# Patient Record
Sex: MALE | Race: WHITE | NOT HISPANIC OR LATINO | Employment: UNEMPLOYED | URBAN - METROPOLITAN AREA
[De-identification: names, ages, dates, MRNs, and addresses within clinical notes are randomized per-mention and may not be internally consistent; named-entity substitution may affect disease eponyms.]

---

## 2022-05-27 ENCOUNTER — HOSPITAL ENCOUNTER (EMERGENCY)
Facility: HOSPITAL | Age: 52
Discharge: PSYCHIATRIC OR SUBSTANCE USE FACILITY | End: 2022-05-28
Attending: EMERGENCY MEDICINE
Payer: MEDICAID

## 2022-05-27 DIAGNOSIS — R45.851 DEPRESSION WITH SUICIDAL IDEATION: Primary | ICD-10-CM

## 2022-05-27 DIAGNOSIS — F32.A DEPRESSION WITH SUICIDAL IDEATION: Primary | ICD-10-CM

## 2022-05-27 LAB
ALBUMIN SERPL-MCNC: 4 GM/DL (ref 3.5–5)
ALBUMIN/GLOB SERPL: 1.2 RATIO (ref 1.1–2)
ALP SERPL-CCNC: 159 UNIT/L (ref 40–150)
ALT SERPL-CCNC: 16 UNIT/L (ref 0–55)
AMPHET UR QL SCN: NEGATIVE
APAP SERPL-MCNC: <17.4 UG/ML (ref 17.4–30)
APPEARANCE UR: CLEAR
AST SERPL-CCNC: 21 UNIT/L (ref 5–34)
BACTERIA #/AREA URNS AUTO: NORMAL /HPF
BARBITURATE SCN PRESENT UR: NEGATIVE
BASOPHILS # BLD AUTO: 0.05 X10(3)/MCL (ref 0–0.2)
BASOPHILS NFR BLD AUTO: 0.9 %
BENZODIAZ UR QL SCN: NEGATIVE
BILIRUB UR QL STRIP.AUTO: NEGATIVE MG/DL
BILIRUBIN DIRECT+TOT PNL SERPL-MCNC: 0.5 MG/DL
BUN SERPL-MCNC: 22.4 MG/DL (ref 8.4–25.7)
CALCIUM SERPL-MCNC: 9.7 MG/DL (ref 8.4–10.2)
CANNABINOIDS UR QL SCN: NEGATIVE
CHLORIDE SERPL-SCNC: 107 MMOL/L (ref 98–107)
CO2 SERPL-SCNC: 25 MMOL/L (ref 22–29)
COCAINE UR QL SCN: NEGATIVE
COLOR UR AUTO: YELLOW
CREAT SERPL-MCNC: 1 MG/DL (ref 0.73–1.18)
EOSINOPHIL # BLD AUTO: 0.16 X10(3)/MCL (ref 0–0.9)
EOSINOPHIL NFR BLD AUTO: 2.8 %
ERYTHROCYTE [DISTWIDTH] IN BLOOD BY AUTOMATED COUNT: 13.1 % (ref 11.5–17)
ETHANOL SERPL-MCNC: <10 MG/DL
FENTANYL UR QL SCN: NEGATIVE
GLOBULIN SER-MCNC: 3.3 GM/DL (ref 2.4–3.5)
GLUCOSE SERPL-MCNC: 122 MG/DL (ref 74–100)
GLUCOSE UR QL STRIP.AUTO: NEGATIVE MG/DL
HCT VFR BLD AUTO: 43.7 % (ref 42–52)
HGB BLD-MCNC: 14 GM/DL (ref 14–18)
IMM GRANULOCYTES # BLD AUTO: 0.02 X10(3)/MCL (ref 0–0.02)
IMM GRANULOCYTES NFR BLD AUTO: 0.4 % (ref 0–0.43)
KETONES UR QL STRIP.AUTO: NEGATIVE MG/DL
LEUKOCYTE ESTERASE UR QL STRIP.AUTO: NEGATIVE UNIT/L
LYMPHOCYTES # BLD AUTO: 1.69 X10(3)/MCL (ref 0.6–4.6)
LYMPHOCYTES NFR BLD AUTO: 29.8 %
MCH RBC QN AUTO: 32 PG (ref 27–31)
MCHC RBC AUTO-ENTMCNC: 32 MG/DL (ref 33–36)
MCV RBC AUTO: 100 FL (ref 80–94)
MDMA UR QL SCN: NEGATIVE
MONOCYTES # BLD AUTO: 0.57 X10(3)/MCL (ref 0.1–1.3)
MONOCYTES NFR BLD AUTO: 10 %
NEUTROPHILS # BLD AUTO: 3.2 X10(3)/MCL (ref 2.1–9.2)
NEUTROPHILS NFR BLD AUTO: 56.1 %
NITRITE UR QL STRIP.AUTO: NEGATIVE
NRBC BLD AUTO-RTO: 0 %
OPIATES UR QL SCN: NEGATIVE
PCP UR QL: NEGATIVE
PH UR STRIP.AUTO: 6.5 [PH]
PH UR: 6.5 [PH] (ref 3–11)
PLATELET # BLD AUTO: 289 X10(3)/MCL (ref 130–400)
PMV BLD AUTO: 9.4 FL (ref 9.4–12.4)
POTASSIUM SERPL-SCNC: 4.5 MMOL/L (ref 3.5–5.1)
PROT SERPL-MCNC: 7.3 GM/DL (ref 6.4–8.3)
PROT UR QL STRIP.AUTO: NEGATIVE MG/DL
RBC # BLD AUTO: 4.37 X10(6)/MCL (ref 4.7–6.1)
RBC #/AREA URNS AUTO: <5 /HPF
RBC UR QL AUTO: NEGATIVE UNIT/L
SODIUM SERPL-SCNC: 140 MMOL/L (ref 136–145)
SP GR UR STRIP.AUTO: 1.02 (ref 1–1.03)
SPECIFIC GRAVITY, URINE AUTO (.000) (OHS): 1.02 (ref 1–1.03)
SQUAMOUS #/AREA URNS AUTO: <4 /LPF
TSH SERPL-ACNC: 2.02 UIU/ML (ref 0.35–4.94)
UROBILINOGEN UR STRIP-ACNC: 0.2 MG/DL
WBC # SPEC AUTO: 5.7 X10(3)/MCL (ref 4.5–11.5)
WBC #/AREA URNS AUTO: <5 /HPF

## 2022-05-27 PROCEDURE — 81001 URINALYSIS AUTO W/SCOPE: CPT | Performed by: PHYSICIAN ASSISTANT

## 2022-05-27 PROCEDURE — 82077 ASSAY SPEC XCP UR&BREATH IA: CPT | Performed by: PHYSICIAN ASSISTANT

## 2022-05-27 PROCEDURE — 80143 DRUG ASSAY ACETAMINOPHEN: CPT | Performed by: PHYSICIAN ASSISTANT

## 2022-05-27 PROCEDURE — 84443 ASSAY THYROID STIM HORMONE: CPT | Performed by: PHYSICIAN ASSISTANT

## 2022-05-27 PROCEDURE — 36415 COLL VENOUS BLD VENIPUNCTURE: CPT | Performed by: PHYSICIAN ASSISTANT

## 2022-05-27 PROCEDURE — 99285 EMERGENCY DEPT VISIT HI MDM: CPT | Mod: 25

## 2022-05-27 PROCEDURE — 80307 DRUG TEST PRSMV CHEM ANLYZR: CPT | Performed by: PHYSICIAN ASSISTANT

## 2022-05-27 PROCEDURE — 85025 COMPLETE CBC W/AUTO DIFF WBC: CPT | Performed by: PHYSICIAN ASSISTANT

## 2022-05-27 PROCEDURE — 80053 COMPREHEN METABOLIC PANEL: CPT | Performed by: PHYSICIAN ASSISTANT

## 2022-05-27 PROCEDURE — 25000003 PHARM REV CODE 250: Performed by: EMERGENCY MEDICINE

## 2022-05-27 RX ORDER — LORAZEPAM 1 MG/1
2 TABLET ORAL
Status: COMPLETED | OUTPATIENT
Start: 2022-05-27 | End: 2022-05-27

## 2022-05-27 RX ORDER — IBUPROFEN 200 MG
1 TABLET ORAL DAILY
Status: CANCELLED | OUTPATIENT
Start: 2022-05-28

## 2022-05-27 RX ADMIN — LORAZEPAM 2 MG: 1 TABLET ORAL at 04:05

## 2022-05-27 NOTE — ED PROVIDER NOTES
"Encounter Date: 5/27/2022       History     Chief Complaint   Patient presents with    Psychiatric Evaluation     Pt to ER via Police for OPC from family.  Per  pt made suicidal statements to family.  Pt denies statements.  States he is 'couch surfing' now due to relocating and not having a job.  Pt states he "I think my parents are going senile".       51-year-old male presents to the emergency department on order protective custody by his mother for danger to self/suicidal statements made to mother in text message form.  Patient denies making these statements.  Per my discussion with his mother, Alice Newman, via phone call just prior to evaluating the patient, she reported and read off various text messages to me over the last couple of days during which the patient reported that he does not know what he did so wrong in life, multiple statements of hopelessness about the future.  She specifically quoted a message that stated that I will be suicidal soon, I guarantee it .  She was very concerned about his mental state as he apparently recently lost his job, lost his housing, is not able to have time with his kids unless directly supervised at their home. States that he has refused when they suggested he needs to get help for his worsening depression.     .    The history is provided by the patient and a relative.   Mental Health Problem  The primary symptoms include depressed mood and dysphoric mood. The primary symptoms do not include self-injury. The current episode started several days ago. This is a new problem.   The onset of the illness is precipitated by stressful event. The degree of incapacity that he is experiencing as a consequence of his illness is mild. Sequelae of the illness include harmed interpersonal relations and homelessness. He admits to suicidal ideas. He does not have a plan to attempt suicide. He contemplates harming himself. He has not already injured self. He does not " contemplate injuring another person. He has not already  injured another person.     Review of patient's allergies indicates:  No Known Allergies  History reviewed. No pertinent past medical history.  History reviewed. No pertinent surgical history.  No family history on file.     Review of Systems   Constitutional: Negative for chills and fever.   Respiratory: Negative for shortness of breath.    Gastrointestinal: Negative for vomiting.   Psychiatric/Behavioral: Positive for dysphoric mood. Negative for self-injury.   All other systems reviewed and are negative.      Physical Exam     Initial Vitals [05/27/22 1309]   BP Pulse Resp Temp SpO2   (!) 152/97 82 18 97.7 °F (36.5 °C) 99 %      MAP       --         Physical Exam    Nursing note and vitals reviewed.  Constitutional: He appears well-developed and well-nourished.   HENT:   Head: Normocephalic and atraumatic.   Eyes: Conjunctivae are normal. Pupils are equal, round, and reactive to light.   Cardiovascular: Regular rhythm.   Pulmonary/Chest: No respiratory distress.     Neurological: He is alert and oriented to person, place, and time. GCS score is 15. GCS eye subscore is 4. GCS verbal subscore is 5. GCS motor subscore is 6.   Skin: Skin is warm and dry.   Psychiatric: He has a normal mood and affect. His speech is normal. Thought content normal.         ED Course   Procedures  Labs Reviewed   COMPREHENSIVE METABOLIC PANEL - Abnormal; Notable for the following components:       Result Value    Glucose Level 122 (*)     Alkaline Phosphatase 159 (*)     All other components within normal limits   ACETAMINOPHEN LEVEL - Abnormal; Notable for the following components:    Acetaminophen Level <17.4 (*)     All other components within normal limits   CBC WITH DIFFERENTIAL - Abnormal; Notable for the following components:    RBC 4.37 (*)     .0 (*)     MCH 32.0 (*)     MCHC 32.0 (*)     IG# 0.02 (*)     All other components within normal limits   TSH - Normal    URINALYSIS, REFLEX TO URINE CULTURE - Normal   DRUG SCREEN, URINE (BEAKER) - Normal   ALCOHOL,MEDICAL (ETHANOL) - Normal   URINALYSIS, MICROSCOPIC - Normal   CBC W/ AUTO DIFFERENTIAL    Narrative:     The following orders were created for panel order CBC auto differential.  Procedure                               Abnormality         Status                     ---------                               -----------         ------                     CBC with Differential[[id number]]        Abnormal            Final result                 Please view results for these tests on the individual orders.          Imaging Results    None          Medications   LORazepam tablet 2 mg (2 mg Oral Given 5/27/22 0667)     Medical Decision Making:   Initial Assessment:   Mr. Jimenez presented for evaluation on OPC after making suicidal statements to his mother.  She read these off to me, certainly concerning for thoughts of self-harm particularly in the setting of multiple social stressors including loss of job, loss of housing, loss of access to his family.  Differential Diagnosis:   Depression with suicidal ideation, dysphoria, situational depression, adjustment disorder  Clinical Tests:   Lab Tests: Ordered and Reviewed  ED Management:  Physician's Emergency certificate filed for danger to self given the report per the mother of expressed suicidal thoughts.  Certainly, he is calm and cooperative here; however, he denies sending the text messages at all so concern that he is not forthcoming with all information and may be at risk of harming himself.  Physical examination and laboratory values demonstrate no acute emergent pathology that would preclude transfer to a psychiatric treatment facility.                       Clinical Impression:   Final diagnoses:  [F32.A, R45.851] Depression with suicidal ideation (Primary)                 Candace Fermin MD  05/27/22 5633

## 2022-05-27 NOTE — FIRST PROVIDER EVALUATION
Medical screening exam completed.  I have conducted a focused provider triage encounter, findings are as follows:    Brief history of present illness:  *Male transported to ER by [facility] under [facility]PC    Vitals:    05/27/22 1309   BP: (!) 152/97   Pulse: 82   Resp: 18   Temp: 97.7 °F (36.5 °C)   TempSrc: Oral   SpO2: 99%       Pertinent physical exam:  Awake alert ambulatory male    Brief workup plan:  Med clearance evaluation    Preliminary workup initiated; this workup will be continued and followed by the physician or advanced practice provider that is assigned to the patient when roomed.

## 2022-05-28 ENCOUNTER — HOSPITAL ENCOUNTER (INPATIENT)
Facility: HOSPITAL | Age: 52
LOS: 5 days | Discharge: OTHER INSTITUTION | DRG: 885 | End: 2022-06-02
Attending: PSYCHIATRY & NEUROLOGY | Admitting: PSYCHIATRY & NEUROLOGY
Payer: MEDICAID

## 2022-05-28 VITALS
RESPIRATION RATE: 18 BRPM | DIASTOLIC BLOOD PRESSURE: 82 MMHG | OXYGEN SATURATION: 99 % | SYSTOLIC BLOOD PRESSURE: 126 MMHG | HEART RATE: 82 BPM | TEMPERATURE: 98 F

## 2022-05-28 DIAGNOSIS — R45.851 DEPRESSION WITH SUICIDAL IDEATION: ICD-10-CM

## 2022-05-28 DIAGNOSIS — F32.A DEPRESSION WITH SUICIDAL IDEATION: ICD-10-CM

## 2022-05-28 PROCEDURE — 11000001 HC ACUTE MED/SURG PRIVATE ROOM

## 2022-05-28 PROCEDURE — 11400000 HC PSYCH PRIVATE ROOM

## 2022-05-28 RX ORDER — ALUMINUM HYDROXIDE, MAGNESIUM HYDROXIDE, AND SIMETHICONE 2400; 240; 2400 MG/30ML; MG/30ML; MG/30ML
30 SUSPENSION ORAL EVERY 6 HOURS PRN
Status: DISCONTINUED | OUTPATIENT
Start: 2022-05-28 | End: 2022-06-02 | Stop reason: HOSPADM

## 2022-05-28 RX ORDER — ADHESIVE BANDAGE
30 BANDAGE TOPICAL DAILY PRN
Status: DISCONTINUED | OUTPATIENT
Start: 2022-05-28 | End: 2022-06-02 | Stop reason: HOSPADM

## 2022-05-28 RX ORDER — HYDROXYZINE PAMOATE 25 MG/1
25 CAPSULE ORAL 4 TIMES DAILY PRN
Status: ON HOLD | COMMUNITY
Start: 2022-04-13 | End: 2022-05-31

## 2022-05-28 RX ORDER — IBUPROFEN 400 MG/1
400 TABLET ORAL EVERY 6 HOURS PRN
Status: DISCONTINUED | OUTPATIENT
Start: 2022-05-28 | End: 2022-06-02 | Stop reason: HOSPADM

## 2022-05-28 RX ORDER — ACETAMINOPHEN 325 MG/1
650 TABLET ORAL EVERY 6 HOURS PRN
Status: DISCONTINUED | OUTPATIENT
Start: 2022-05-28 | End: 2022-06-02 | Stop reason: HOSPADM

## 2022-05-28 NOTE — GROUP NOTE
Activity Group      Group Focus: Offered group to increase individual focus and peer interactions.     Number of patients in attendance: 6    Group Start Time: 1430  Group End Time:  1515  Groups Date: 5/28/2022  Group Topic:  Behavioral Health  Group Department: [facility]  Group Facilitators:  Lashell Deutsch  _____________________________________________________________________    Patient Name: Ranjith Jimenez  MRN: [id number]  Patient Class: IP- Psych   Admission Date\Time: 5/28/2022  3:32 AM  Hospital Length of Stay: 0  Primary Care Provider: Primary Doctor No     Referred by: Behavioral Medicine Unit Treatment Team     Target symptoms: Depression     Patient's response to treatment: Very good participation.     Progress toward goals: Progressing slowly     Interval History: N/A     Diagnosis: Depression with suicidal ideation     Plan: Continue treatment on BMU

## 2022-05-28 NOTE — H&P
History and Physical     Chief Complaint:     Suicidal ideation    HPI:     Patient is a 46 y.o. male with a past medical history significant for only depression which he states he does not take any current medical regimen to manage.  In the past he states medicine is making feel funny.  Patient states that he has been down his leg since COVID recently employed but lost his job probably some involved poor decision making.  He has been feeling this way for several months now.  He finds himself mostly homeless is at his mother's house and mentions that he does get some sort of helps him that he could see harming himself.  He endorses history of drug abuse including cocaine and methamphetamine but nothing recently.  He denies any other acute medical issues.        Primary Doctor No is the PCP    medical history - depression  surgical history none  family history father with coronary artery disease    Social History     Tobacco Use    Smoking status: Never Smoker    Smokeless tobacco: Never Used   Substance Use Topics    Alcohol use: Not on file   -remote cocaine and methamphetamine use none recently  (Not in a hospital admission)    Review of patient's allergies indicates:  Not on File         Review of Systems:     Review of Systems   Constitutional: Positive for malaise/fatigue.   HENT: Negative.    Eyes: Negative.    Respiratory: Negative.    Cardiovascular: Negative.    Gastrointestinal: Negative.    Genitourinary: Negative.    Musculoskeletal: Negative.    Neurological: Negative.    Endo/Heme/Allergies: Negative.    Psychiatric/Behavioral: Positive for depression and suicidal ideas.   All other systems reviewed and are negative.      Objective:       VITAL SIGNS: 24 HR MIN & MAX LAST    Temp  Min: 97.5 °F (36.4 °C)  Max: 98.2 °F (36.8 °C)           BP  Min: 112/85  Max: 153/93        Pulse  Min: 73  Max: 82        Resp  Min: 16  Max: 18       SpO2  Min: 99 %  Max: 99 %         Physical Exam  Vitals reviewed.    Constitutional:       General: He is not in acute distress.     Appearance: Normal appearance. He is normal weight. He is not ill-appearing or toxic-appearing.   HENT:      Head: Normocephalic.      Nose: Nose normal.   Eyes:      Extraocular Movements: Extraocular movements intact.      Conjunctiva/sclera: Conjunctivae normal.      Pupils: Pupils are equal, round, and reactive to light.   Cardiovascular:      Rate and Rhythm: Normal rate and regular rhythm.      Pulses: Normal pulses.      Heart sounds: Normal heart sounds. No murmur heard.    No gallop.   Pulmonary:      Effort: Pulmonary effort is normal. No respiratory distress.      Breath sounds: Normal breath sounds. No wheezing, rhonchi or rales.   Abdominal:      General: Bowel sounds are normal. There is no distension.      Palpations: Abdomen is soft.      Tenderness: There is no abdominal tenderness. There is no guarding or rebound.   Musculoskeletal:         General: No swelling, tenderness or deformity. Normal range of motion.      Cervical back: Normal range of motion and neck supple. No rigidity or tenderness.      Right lower leg: No edema.      Left lower leg: No edema.   Skin:     General: Skin is warm and dry.   Neurological:      General: No focal deficit present.      Mental Status: He is alert and oriented to person, place, and time.   Psychiatric:         Behavior: Behavior normal.      Comments: Flat affect     *      No results found for this or any previous visit (from the past 48 hour(s)).    No orders to display       Assessment / Plan:     Active Hospital Problems    Diagnosis    Suicidal ideation    Depression     # suicidal ideation from severe major depression-patient is currently homeless  -history of illicit drug abuse  -history of noncompliance with depression regimen  -otherwise no acute underlying medical problems  -management as per Psychiatry    Encourage ambulation for DVT prophylaxis    Components of this note were  documented using voice recognition systems; and are subject to errors not corrected at proof reading.  Please contact the author for any clarifications.

## 2022-05-28 NOTE — NURSING
Patient is a 51 year old male admitted to the services of Dr. Viramontes at [facility] from [facility].  Patient admitted via [facility] with a provisional diagnosis of Major Depression with Suicidal Ideations.  Patient reports that he was staying at a couple of people's homes for stability to obtain a job and his mom called and told him that he could live with her and his dad.  Patient stated he proceeded to his parents home and his mother began calling homeless shelter's, rehabs and [facility] group [facility] for him to live.  Patient stated he believes that his being in the family home was causing conflict between his parents.  Patient stated all he wanted was a breather from living on the streets and he became frustrated and texted his mother stating if he became homeless that he would probably have suicidal ideations.  Patient stated the next thing he knew, the  was at the house placing handcuffs on him.  Patient stated this is not the first time this has happened to him.  Patient stated 5-6 years ago his child was diagnosed with autism and his ex-wife OPC'd him and he was taken to [facility] for 8-9 days.  Patient stated his anxiety was very high in the ER and he was given Ativan 2mg which helped him to remain calm.  Patient is disheveled, constricted affect, and anxiious mood. Patient stated when the police came to pick him up he was playing golf frisbee and he left  his cell phone and personal effects at his parents home.  Patient stated he does not have a good sleep pattern and that he naps during the day.  Patient states it is hard to sleep good when you are sleeping outside then inside.  Patient admits to a history of alcohol and substance abuse.  Patient stated he was around a lot of musical bands and over indulged in alcohol and drugs in the past.  Patient stated he was in the [employer] from [employer] and was active during [employer]. 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  Patient stated his home is irrepairable due to someone named David who tore it up and is in the process of being foreclosed.  Patient also reports he lost his job due to Covid-19. Patient states he is unable to see his two kids like he would like to (ages 8,7)  Patient stated he recently worked for [employer] in [place] for approximately 6 months.  Patient has constricted affect and anxious mood.  Patient states his anxiety is back but not as bad.  Patient is restless, fidgety and has difficulty sitting still.  Urinary Drug Screen was negative.  No acute medical illnesses. Patient's name placed on H & P board to be seen by medical doctor.  Dietary notified.  No Known Drug Allergies.  Patient was searched and belongings inventoried.  Patient oriented to unit, staff, room and peers.  Per Dr. Trevino's orders, patient placed on Q 15 minute checks for safety.  No acute distress noted.  Will continue to monitor closely and provide emotional support.

## 2022-05-28 NOTE — NURSING
Ranjith has been primarily isolating in his room today;  Does get up for meals. Hospitalist did see pt today for H&P.  Ate 100% of both meals today, no complaints verbalized.  He does state that he is physically and emotionally tired;  Denies active SI, HI and denies AVH.  Did not attend groups today thus far.  Minimizes his responsibility for his current situation.  Q 15 min and prn checks continue for safety, SP.

## 2022-05-28 NOTE — GROUP NOTE
Medication Education      Group Focus: Offered group to increase knowledge of meds, side effect, safety and importance of compliance.      Number of patients in attendance: 4    Group Start Time: 0915  Group End Time:  1000  Groups Date: 5/28/2022  Group Topic:  Behavioral Health  Group Department: [facility]  Group Facilitators:  Yamilet Akins LPN  _____________________________________________________________________    Patient Name: Ranjith Jimenez  MRN: [id number]  Patient Class: IP- Psych   Admission Date\Time: 5/28/2022  3:32 AM  Hospital Length of Stay: 0  Primary Care Provider: Primary Doctor No     Referred by: Behavioral Medicine Unit Treatment Team     Target symptoms: {NA     Patient's response to treatment:DID NOT ATTEND     Progress toward goals: NA      Interval History: NA     Diagnosis: NA     Plan: NA

## 2022-05-29 PROBLEM — Z86.59 HISTORY OF PSYCHIATRIC HOSPITALIZATION: Status: ACTIVE | Noted: 2022-05-29

## 2022-05-29 PROBLEM — Z86.59 HISTORY OF PSYCHIATRIC HOSPITALIZATION: Status: RESOLVED | Noted: 2022-05-29 | Resolved: 2022-05-29

## 2022-05-29 PROBLEM — F33.9 RECURRENT MAJOR DEPRESSIVE DISORDER: Status: ACTIVE | Noted: 2022-05-28

## 2022-05-29 PROBLEM — F99 PSYCHIATRIC PROBLEM: Status: RESOLVED | Noted: 2022-05-29 | Resolved: 2022-05-29

## 2022-05-29 PROBLEM — F99 PSYCHIATRIC PROBLEM: Status: ACTIVE | Noted: 2022-05-29

## 2022-05-29 PROBLEM — F32.A DEPRESSIVE DISORDER: Status: RESOLVED | Noted: 2022-05-28 | Resolved: 2022-05-29

## 2022-05-29 LAB
CHOLEST SERPL-MCNC: 198 MG/DL
CHOLEST/HDLC SERPL: 3 {RATIO} (ref 0–5)
GLUCOSE P FAST SERPL-MCNC: 102 MG/DL (ref 74–100)
HDLC SERPL-MCNC: 66 MG/DL (ref 35–60)
LDLC SERPL CALC-MCNC: 116 MG/DL (ref 50–140)
T PALLIDUM AB SER QL: NONREACTIVE
T PALLIDUM AB SER QL: NORMAL
TRIGL SERPL-MCNC: 80 MG/DL (ref 34–140)
VLDLC SERPL CALC-MCNC: 16 MG/DL

## 2022-05-29 PROCEDURE — 11000001 HC ACUTE MED/SURG PRIVATE ROOM

## 2022-05-29 PROCEDURE — 82947 ASSAY GLUCOSE BLOOD QUANT: CPT | Performed by: PSYCHIATRY & NEUROLOGY

## 2022-05-29 PROCEDURE — 36415 COLL VENOUS BLD VENIPUNCTURE: CPT | Performed by: PSYCHIATRY & NEUROLOGY

## 2022-05-29 PROCEDURE — 11400000 HC PSYCH PRIVATE ROOM

## 2022-05-29 PROCEDURE — 86780 TREPONEMA PALLIDUM: CPT | Performed by: PSYCHIATRY & NEUROLOGY

## 2022-05-29 PROCEDURE — 80061 LIPID PANEL: CPT | Performed by: PSYCHIATRY & NEUROLOGY

## 2022-05-29 NOTE — GROUP NOTE
"Didactic Group       Group Focus: Offered group on characteristics of healthy relationships.      Number of patients in attendance: 6    Group Start Time: 0945  Group End Time:  1030  Groups Date: 5/29/2022  Group Topic:  Behavioral Health  Group Department: [facility]  Group Facilitators:  Yamilet Akins LPN  _____________________________________________________________________    Patient Name: Ranjith Jimenez  MRN: [id number]  Patient Class: IP- Psych   Admission Date\Time: 5/28/2022  3:32 AM  Hospital Length of Stay: 1  Primary Care Provider: Primary Doctor No     Referred by: Acute Psychiatry Unit Treatment Team     Target symptoms: Depression     Patient's response to treatment: " You find out who is your friend when your down on your luck"     Progress toward goals: Progressing well     Interval History: cooperative     Diagnosis:depression     Plan: Continue treatment on BMU      "

## 2022-05-29 NOTE — ASSESSMENT & PLAN NOTE
Medications were discussed but he refused to start any at this time. Claims his issues are situational and that he does not need any medication

## 2022-05-29 NOTE — HOSPITAL COURSE
He was admitted without any medications. He denies needing any and is refusing to start any. He will let us know if any symptoms either appear or worsen. He was encouraged to participate in all groups and unit activities however he has not as of yet. He was calm and cooperative. He did not require psychiatric meds. He agreed to go to a rehab program. He no longer had si/hi and had no psychosis. He engaged more in treatment. Mood stabilized and he was more hopeful. He did not need psych meds. HE agreed to go to rehab and was accepted to [facility]. HE was stable for discharge.

## 2022-05-29 NOTE — SUBJECTIVE & OBJECTIVE
Patient History               Medical as of 5/29/2022       Past Medical History       Diagnosis Date Comments Source    Anxiety -- -- Provider    Depression -- -- Provider    History of psychiatric hospitalization -- -- Provider    Hx of psychiatric care -- -- Provider    Psychiatric problem -- -- Provider                          Surgical as of 5/29/2022    Past Surgical History: Patient provided no pertinent surgical history.               Family as of 5/29/2022       Problem Relation Name Age of Onset Comments Source    Anxiety disorder Mother -- -- -- Provider                  Tobacco Use as of 5/29/2022       Smoking Status Smoking Start Date Smoking Quit Date Packs/Day Years Used    Never Smoker -- -- -- --      Types Comments Smokeless Tobacco Status Smokeless Tobacco Quit Date Source    -- -- Never Used -- Provider                  Alcohol Use as of 5/29/2022       Alcohol Use Drinks/Week Alcohol/Week Comments Source    Not Currently   -- heavy drinking in the past Provider                  Drug Use as of 5/29/2022       Drug Use Types Frequency Comments Source    Not Currently  Amphetamines -- -- Provider                  Sexual Activity as of 5/29/2022       Sexually Active Birth Control Partners Comments Source    Not Currently -- -- -- Provider                  Activities of Daily Living as of 5/29/2022    None               Social Documentation as of 5/29/2022    He is single. He never  the mother of his children however they lived together as a family until ending the relationship three years ago. 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 He is a  of the [employer] and served in [facility] [facility]. He is homeless at present as well as unemployed.   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                   Socioeconomic as of 5/29/2022       Marital Status Spouse Name Number of Children Years Education Education Level Preferred Language Ethnicity Race Source    Single -- 2 -- -- English Not  or /a White Provider                  Pertinent History       Question Response Comments    Lives with -- --    Place in Birth Order 2nd --    Lives in homeless --    Number of Siblings 2 --    Raised by biological parents --    Legal Involvement -- --    Childhood Trauma uneventful --    Criminal History of -- --    Financial Status unemployed --    Highest Level of 's Degree --    Does patient have access to a firearm? No --     Service Yes --    Primary Leisure Activity exercise --    Spirituality -- --          Past Medical History:   Diagnosis Date    Anxiety     Depression     History of psychiatric hospitalization     Hx of psychiatric care     Psychiatric problem      No past surgical history on file.  Family History       Problem Relation (Age of Onset)    Anxiety disorder Mother          Tobacco Use    Smoking status: Never Smoker    Smokeless tobacco: Never Used   Substance and Sexual Activity    Alcohol use: Not Currently     Comment: heavy drinking in the past    Drug use: Not Currently     Types: Amphetamines    Sexual activity: Not Currently     Review of patient's allergies indicates:  No Known Allergies    No current facility-administered medications on file prior to encounter.     Current Outpatient Medications on File Prior to Encounter   Medication Sig    hydrOXYzine pamoate (VISTARIL) 25 MG Cap Take 25 mg by mouth 4 (four) times daily as needed.     Psychotherapeutics (From admission, onward)                None          Review of Systems   Psychiatric/Behavioral:  Positive for dysphoric mood and sleep disturbance. The patient is nervous/anxious.    All other systems reviewed and are negative.  Strengths and Liabilities: Strength: Patient accepts guidance/feedback,  Strength: Patient is expressive/articulate., Strength: Patient is intelligent., Strength: Patient is physically healthy., Liability: Patient is impulsive., Liability: Patient has poor judgment    Objective:     Vital Signs (Most Recent):  Temp: 98.1 °F (36.7 °C) (05/29/22 1700)  Pulse: 78 (05/29/22 1700)  Resp: 20 (05/29/22 1700)  BP: 134/84 (05/29/22 1700) Vital Signs (24h Range):  Temp:  [97.5 °F (36.4 °C)-98.1 °F (36.7 °C)] 98.1 °F (36.7 °C)  Pulse:  [67-78] 78  Resp:  [18-20] 20  BP: (134-146)/(84-90) 134/84        Weight: 91.5 kg (201 lb 11.5 oz)  There is no height or weight on file to calculate BMI.      Intake/Output Summary (Last 24 hours) at 5/29/2022 1824  Last data filed at 5/29/2022 1700  Gross per 24 hour   Intake --   Output 1 ml   Net -1 ml       Physical Exam  Vitals and nursing note reviewed.   Neurological:      Mental Status: He is oriented to person, place, and time.   Psychiatric:         Speech: Speech normal.     NEUROLOGICAL EXAMINATION:     MENTAL STATUS   Oriented to person, place, and time.   Recall at 5 minutes: recalls 3 of 3 objects.   Attention: normal. Concentration: normal.   Speech: speech is normal   Level of consciousness: alert  Knowledge: consistent with education.   Able to name object. Able to read. Able to repeat. Able to write. Normal comprehension.        Psychiatric Mental Status Exam:  General Appearance: appears stated age, well-developed, well-nourished  Arousal: alert  Behavior: cooperative  Movements and Motor Activity: no abnormal involuntary movements noted  Orientation: oriented to person, place, time, and situation  Speech: normal rate, normal rhythm, normal volume, normal tone  Mood: sad, overwhelmed  Affect: mood-congruent, constricted  Thought Process: linear, logical  Associations: intact  Thought Content and Perceptions: denied suicidal ideation, no homicidal ideation, no auditory hallucinations, no visual hallucinations, no paranoid ideation, no ideas of  reference, no evidence of delusions or psychosis  Recent and Remote Memory: recent memory intact, remote memory intact  Attention and Concentration: intact, attentive to conversation  Fund of Knowledge: intact, aware of current events, vocabulary appropriate  Insight: intact  Judgment: fair Praxis: normal   Significant Labs: Last 24 Hours:   Recent Lab Results         05/29/22  0442   05/29/22  0441        Cholesterol 198         Gluc Fast 102         HDL 66         LDL Cholesterol External 116.00         Syphilis Antibody   Nonreactive       Syphilis Antibody #         Total Cholesterol/HDL Ratio 3         Triglycerides 80         Very Low Density Lipoprotein 16

## 2022-05-29 NOTE — NURSING
Patient is awake and alert on the unit. Patient has constricted affect and anxious mood.  Patient admits to having anxiety, impatience and trouble sleeping.  Patient is focused on being discharged  Patient has low energy and motivation.  Patient did not attend am groups but did attend evening group.  Patient is superficial and blames others for events in his life.  No acute distress noted.  Will continue to monitor closely and provide emotional support.

## 2022-05-29 NOTE — NURSING
Patient slept throughout the night.  No acute distress noted.  Will continue to monitor closely and provide emotional support.

## 2022-05-29 NOTE — NURSING
"Awake alert and oriented. Preoccupied, anxious, worrisome in regards to housing. Interacts when approached by staff. Depressed but denies SI. Pt does not want to take medications, " I would like to try to manage without it". Walks and meditates to cope. Q 15 min safety checks. Will continue to monitor mood and behavior and offer emotional support.  "

## 2022-05-29 NOTE — H&P
"[facility]  Psychiatry  History & Physical    Patient Name: Ranjith Jimenez  MRN: [id number]   Code Status: Full Code  Admission Date: 5/28/2022  Attending Physician: Ge Viramontes MD   Primary Care Provider: Primary Doctor No    Current Legal Status: Physician's Emergency Certificate (PEC)    Patient information was obtained from patient and ER records.     Subjective:     Principal Problem: Depression and Homelessness    Chief Complaint: "I found myself in an odd situation"    HPI:   51 year old male admitted on a PEC. His mother initiated an OPC out of concern for his recent decisions, being homeless and sleeping in the streets for the first time in his life and constant arguing with her. She also reported that he said if he had to sleep in the streets he would kill himself. He didn't deny the statement but he denied wanting to harm himself. Today he reports that mentally and physically he feels that he is "finally doing good" and that the only problem is that he has "lost everything" and "has no place to live and no job". Stated "I was a very heavy drinker and abused stimulants for a long time. 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 He  from his girlfriend one year before covid began and became severely depressed over not being able to see his children. He claims that it was because she was manipulative but he was using at the time. He can only visit them at his mother's home and they do not get along. He claims that she "meddles and is controlling and doesn't know her place". 8 months ago he moved to [place] and was working for [employer]. Reportedly hurt his back and moved back to his home only to find it "completely trashed and in foreclosure". He admits to struggling with depression and anxiety in the past. He reports responding poorly to antidepressants and is " refusing to start any here. He is hoping to get signed up for his VA benefits in order to assist him at this time. He denied any suicidal or homicidal ideations and he denied any death wishes.           Patient History               Medical as of 5/29/2022       Past Medical History       Diagnosis Date Comments Source    Anxiety -- -- Provider    Depression -- -- Provider    History of psychiatric hospitalization -- -- Provider    Hx of psychiatric care -- -- Provider    Psychiatric problem -- -- Provider                          Surgical as of 5/29/2022    Past Surgical History: Patient provided no pertinent surgical history.               Family as of 5/29/2022       Problem Relation Name Age of Onset Comments Source    Anxiety disorder Mother -- -- -- Provider                  Tobacco Use as of 5/29/2022       Smoking Status Smoking Start Date Smoking Quit Date Packs/Day Years Used    Never Smoker -- -- -- --      Types Comments Smokeless Tobacco Status Smokeless Tobacco Quit Date Source    -- -- Never Used -- Provider                  Alcohol Use as of 5/29/2022       Alcohol Use Drinks/Week Alcohol/Week Comments Source    Not Currently   -- heavy drinking in the past Provider                  Drug Use as of 5/29/2022       Drug Use Types Frequency Comments Source    Not Currently  Amphetamines -- -- Provider                  Sexual Activity as of 5/29/2022       Sexually Active Birth Control Partners Comments Source    Not Currently -- -- -- Provider                  Activities of Daily Living as of 5/29/2022    None               Social Documentation as of 5/29/2022    He is single. He never  the mother of his children however they lived together as a family until ending the relationship three years ago. 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 He is a  of the [employer] and served in [facility] [facility]. He is homeless at present as well as  unemployed.   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                  Socioeconomic as of 5/29/2022       Marital Status Spouse Name Number of Children Years Education Education Level Preferred Language Ethnicity Race Source    Single -- 2 -- -- English Not  or /a White Provider                  Pertinent History       Question Response Comments    Lives with -- --    Place in Birth Order 2nd --    Lives in homeless --    Number of Siblings 2 --    Raised by biological parents --    Legal Involvement -- --    Childhood Trauma uneventful --    Criminal History of -- --    Financial Status unemployed --    Highest Level of 's Degree --    Does patient have access to a firearm? No --     Service Yes --    Primary Leisure Activity exercise --    Spirituality -- --          Past Medical History:   Diagnosis Date    Anxiety     Depression     History of psychiatric hospitalization     Hx of psychiatric care     Psychiatric problem      No past surgical history on file.  Family History       Problem Relation (Age of Onset)    Anxiety disorder Mother          Tobacco Use    Smoking status: Never Smoker    Smokeless tobacco: Never Used   Substance and Sexual Activity    Alcohol use: Not Currently     Comment: heavy drinking in the past    Drug use: Not Currently     Types: Amphetamines    Sexual activity: Not Currently     Review of patient's allergies indicates:  No Known Allergies    No current facility-administered medications on file prior to encounter.     Current Outpatient Medications on File Prior to Encounter   Medication Sig    hydrOXYzine pamoate (VISTARIL) 25 MG Cap Take 25 mg by mouth 4 (four) times daily as needed.     Psychotherapeutics (From admission, onward)                None          Review of Systems   Psychiatric/Behavioral:   Positive for dysphoric mood and sleep disturbance. The patient is nervous/anxious.    All other systems reviewed and are negative.  Strengths and Liabilities: Strength: Patient accepts guidance/feedback, Strength: Patient is expressive/articulate., Strength: Patient is intelligent., Strength: Patient is physically healthy., Liability: Patient is impulsive., Liability: Patient has poor judgment    Objective:     Vital Signs (Most Recent):  Temp: 98.1 °F (36.7 °C) (05/29/22 1700)  Pulse: 78 (05/29/22 1700)  Resp: 20 (05/29/22 1700)  BP: 134/84 (05/29/22 1700) Vital Signs (24h Range):  Temp:  [97.5 °F (36.4 °C)-98.1 °F (36.7 °C)] 98.1 °F (36.7 °C)  Pulse:  [67-78] 78  Resp:  [18-20] 20  BP: (134-146)/(84-90) 134/84        Weight: 91.5 kg (201 lb 11.5 oz)  There is no height or weight on file to calculate BMI.      Intake/Output Summary (Last 24 hours) at 5/29/2022 1824  Last data filed at 5/29/2022 1700  Gross per 24 hour   Intake --   Output 1 ml   Net -1 ml       Physical Exam  Vitals and nursing note reviewed.   Neurological:      Mental Status: He is oriented to person, place, and time.   Psychiatric:         Speech: Speech normal.     NEUROLOGICAL EXAMINATION:     MENTAL STATUS   Oriented to person, place, and time.   Recall at 5 minutes: recalls 3 of 3 objects.   Attention: normal. Concentration: normal.   Speech: speech is normal   Level of consciousness: alert  Knowledge: consistent with education.   Able to name object. Able to read. Able to repeat. Able to write. Normal comprehension.        Psychiatric Mental Status Exam:  General Appearance: appears stated age, well-developed, well-nourished  Arousal: alert  Behavior: cooperative  Movements and Motor Activity: no abnormal involuntary movements noted  Orientation: oriented to person, place, time, and situation  Speech: normal rate, normal rhythm, normal volume, normal tone  Mood: sad, overwhelmed  Affect: mood-congruent, constricted  Thought Process: linear,  logical  Associations: intact  Thought Content and Perceptions: denied suicidal ideation, no homicidal ideation, no auditory hallucinations, no visual hallucinations, no paranoid ideation, no ideas of reference, no evidence of delusions or psychosis  Recent and Remote Memory: recent memory intact, remote memory intact  Attention and Concentration: intact, attentive to conversation  Fund of Knowledge: intact, aware of current events, vocabulary appropriate  Insight: intact  Judgment: fair Praxis: normal   Significant Labs: Last 24 Hours:   Recent Lab Results         05/29/22  0442   05/29/22  0441        Cholesterol 198         Gluc Fast 102         HDL 66         LDL Cholesterol External 116.00         Syphilis Antibody   Nonreactive       Syphilis Antibody #         Total Cholesterol/HDL Ratio 3         Triglycerides 80         Very Low Density Lipoprotein 16                   Assessment/Plan:     * Recurrent major depressive disorder  Medications were discussed but he refused to start any at this time. Claims his issues are situational and that he does not need any medication    Suicidal ideation  He denied ever being suicidal. We will monitor closely     Estimated Discharge Date:   Initial Discharge Plan: Other: needs help with placment    Prognosis: Fair    Need for Continued Hospitalization:   Protective inpatient psychiatric hospitalization required while a safe disposition plan is enacted.    Total Time: 30 with greater than 50% of time spent in counseling and/or coordination of care.     Christopher Cagle, PMHNP   Psychiatry  [facility]

## 2022-05-29 NOTE — HPI
"  51 year old male admitted on a PEC. His mother initiated an OPC out of concern for his recent decisions, being homeless and sleeping in the streets for the first time in his life and constant arguing with her. She also reported that he said if he had to sleep in the streets he would kill himself. He didn't deny the statement but he denied wanting to harm himself. Today he reports that mentally and physically he feels that he is "finally doing good" and that the only problem is that he has "lost everything" and "has no place to live and no job". Stated "I was a very heavy drinker and abused stimulants for a long time. 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 He  from his girlfriend one year before covid began and became severely depressed over not being able to see his children. He claims that it was because she was manipulative but he was using at the time. He can only visit them at his mother's home and they do not get along. He claims that she "meddles and is controlling and doesn't know her place". 8 months ago he moved to [place] and was working for [employer]. Reportedly hurt his back and moved back to his home only to find it "completely trashed and in foreclosure". He admits to struggling with depression and anxiety in the past. He reports responding poorly to antidepressants and is refusing to start any here. He is hoping to get signed up for his VA benefits in order to assist him at this time. He denied any suicidal or homicidal ideations and he denied any death wishes.  "

## 2022-05-30 PROCEDURE — 11000001 HC ACUTE MED/SURG PRIVATE ROOM

## 2022-05-30 PROCEDURE — 11400000 HC PSYCH PRIVATE ROOM

## 2022-05-30 PROCEDURE — 25000003 PHARM REV CODE 250: Performed by: PSYCHIATRY & NEUROLOGY

## 2022-05-30 RX ORDER — TRAZODONE HYDROCHLORIDE 50 MG/1
100 TABLET ORAL ONCE
Status: COMPLETED | OUTPATIENT
Start: 2022-05-30 | End: 2022-05-30

## 2022-05-30 RX ORDER — TRAZODONE HYDROCHLORIDE 50 MG/1
100 TABLET ORAL ONCE
Status: DISCONTINUED | OUTPATIENT
Start: 2022-05-30 | End: 2022-05-30

## 2022-05-30 RX ADMIN — TRAZODONE HYDROCHLORIDE 100 MG: 50 TABLET ORAL at 01:05

## 2022-05-30 NOTE — GROUP NOTE
THERAPEUTIC ACTIVITY GROUP      Group Focus: Coping skills to deal with mental challenges and create new life goals and healthy habits     Number of patients in attendance: 9    Group Start Time: 1300  Group End Time:  1345  Groups Date: 5/30/2022  Group Topic:  Behavioral Health  Group Department: [facility]  Group Facilitators:  CASANDRA Dsouza   _____________________________________________________________________    Patient Name: Ranjith Jimenez  MRN: [id number]  Patient Class: IP- Psych   Admission Date\Time: 5/28/2022  3:32 AM  Hospital Length of Stay: 2  Primary Care Provider: Primary Doctor No     Referred by:  Behavioral Medicine Unit Treatment Team     Target symptoms: Depression     Patient's response to treatment: Active Listening and Self-disclosure     Progress toward goals: Progressing adequately     Interval History: Good Participation     Diagnosis: Depression with SI     Plan: Continue treatment on BMU

## 2022-05-30 NOTE — GROUP NOTE
Group Psychotherapy       Group Focus: Strength Training and Life Skills      Number of patients in attendance: 7    Group Start Time: 0900  Group End Time:  0945  Groups Date: 5/30/2022  Group Topic:  Behavioral Health  Group Department: [facility]  Group Facilitators:  Russell Saab LCSW  _____________________________________________________________________    Patient Name: Ranjith Jimenez  MRN: [id number]  Patient Class: IP- Psych   Admission Date\Time: 5/28/2022  3:32 AM  Hospital Length of Stay: 2  Primary Care Provider: Primary Doctor No     Referred by: Behavioral Medicine Unit Treatment Team     Target symptoms: Depression     Patient's response to treatment: Self-disclosure     Progress toward goals: Progressing slowly     Interval History: Pt continues to be stuck in the problem.  Pt has several stressors going on in his life.  Most pressing is a place to live.     Diagnosis: MDD     Plan: Continue treatment on BMU

## 2022-05-30 NOTE — NURSING
Awake alert and oriented. Poor sleep last night, Trazadone started at 0100. He is anxious, depressed, preoccupied. Interacts with peers and staff, does not want medications. Walking laps in hallway as coping mechanism. Appetite is good. Denies SI or hallucinations. Pt is seeking placement at [facility]. Will continue to monitor mood and behavior and offer emotional support.   SCDs

## 2022-05-30 NOTE — GROUP NOTE
Group Psychotherapy       Group Focus: Symptoms Management      Number of patients in attendance: 8    Group Start Time: 1600  Group End Time:  1645  Groups Date: 5/30/2022  Group Topic:  Behavioral Health  Group Department: [facility]  Group Facilitators:  Lenka Joaquin RN  _____________________________________________________________________    Patient Name: Ranjith Jimenez  MRN: [id number]  Patient Class: IP- Psych   Admission Date\Time: 5/28/2022  3:32 AM  Hospital Length of Stay: 2  Primary Care Provider: Primary Doctor No     Referred by: Behavioral Medicine Unit Treatment Team     Target symptoms: Depression and Anxiety     Patient's response to treatment: Active Listening, Self-disclosure and Frequent Questions     Progress toward goals: Progressing adequately     Interval History: Attended and participated with good response     Diagnosis: Depression with SI     Plan: Continue treatment on BMU

## 2022-05-30 NOTE — PSYCH
Family contact:  Spoke with pt's mother.  She states that she has been trying to get pt help but that he would not cooperate.  She had contacted [facility] for their veterans program and was told he would be eligible.  I will contact them..

## 2022-05-30 NOTE — PLAN OF CARE
Contacted [facility] and talked with Jie.  She stated she had also talked to Rnajith's mother and ranjith is likely eligable for their program.  She will reach out to [facility] in the AM to start the process.

## 2022-05-30 NOTE — NURSING
Patient is lying in bed resting quietly.  Patient has flat affect and depressed mood.  Patient admits to having low energy and motivation. Patient is withdrawn and isolates to room.  No acute distress noted.  Will continue to monitor closely and provide emotional support.

## 2022-05-30 NOTE — PSYCH
Behavioral Health Unit  Psychosocial History and Assessment  Progress Note      Patient Name: Ranjith Jimenez YOB: 1970 SW: Russell Saab, LCSW Date: 5/30/2022    Chief Complaint: addictive disorder and depression    Consent:     Did the patient consent for an interview with the ? Yes    Did the patient consent for the  to contact family/friend/caregiver?   Yes  Name: Jenny and Relationship: Mother    Did the patient give consent for the  to inform family/friend/caregiver of his/her whereabouts or to discuss discharge planning? Yes    Source of Information: Face to face with patient and Telephone interview with family/friend/caregiver    Is information obtained from interviews considered reliable?   yes    Reason for Admission:     Active Hospital Problems    Diagnosis  POA    *Recurrent major depressive disorder [F33.9]  Yes    Suicidal ideation [R45.851]  Not Applicable      Resolved Hospital Problems   No resolved problems to display.       History of Present Illness - (Patient Perception):   Patient is a 51 year old male admitted to the services of Dr. Viramontes at [facility] from [facility].  Patient admitted via PEC with a provisional diagnosis of Major Depression with Suicidal Ideations.  Patient reports that he was staying at a couple of people's homes for stability to obtain a job and his mom called and told him that he could live with her and his dad.  Patient stated he proceeded to his parents home and his mother began calling homeless shelter's, rehabs and [facility] group home for him to live.  Patient stated he believes that his being in the family home was causing conflict between his parents.  Patient stated all he wanted was a breather from living on the streets and he became frustrated and texted his mother stating if he became homeless that he would probably have suicidal ideations.  Patient stated the next thing he knew,  the  was at the house placing handcuffs on him.  Patient stated this is not the first time this has happened to him.  Patient stated 5-6 years ago his child was diagnosed with autism and his ex-wife OPC'd him and he was taken to [facility] for 8-9 days.  Patient stated his anxiety was very high in the ER and he was given Ativan   Pt has a history of alcohol and cocaine use.  Pt is seeking some type of rehab.    History of Present Illness - (Perception of Others): Pt has an increasing amount of symptoms, depression and anxiety according to mother    Present biopsychosocial functioning: Poor.  Pt has been unable  to work.  Pt's home is being foreclosed upon.  Pt is struggling with personal relationships.      Past biopsychosocial functioning: Pt has history of higher successful function.    Family and Marital/Relationship History:     Significant Other/Partner Relationships:  Single:  Relationship cutoff    Family Relationships: Strained      Childhood History:     Where was patient raised? [place]    Who raised the patient? parents      How does patient describe their childhood? good      Who is patient's primary support person? Mother Jenny      Culture and Restorationism:     Restorationism: No Restorationism    How strong of a role does Yazidism and spirituality play in patient's life? none    Church or spiritual concerns regarding treatment: not applicable     History of Abuse:   History of Abuse: Denies      Outcome:     Psychiatric and Medical History:     History of psychiatric illness or treatment: prior inpatient treatment and has participated in counseling/psychotherapy on an outpatient basis in the past    Medical history:   Past Medical History:   Diagnosis Date    Anxiety     Depression     Depressive disorder 5/28/2022    History of psychiatric hospitalization     Hx of psychiatric care     Psychiatric problem        Substance Abuse History:     Alcohol - (Patient Perspective):   Social  History     Substance and Sexual Activity   Alcohol Use Not Currently    Comment: heavy drinking in the past       Alcohol - (Collateral Perspective): sporadic according to patient    Drugs - (Patient Perspective):   Social History     Substance and Sexual Activity   Drug Use Not Currently    Types: Amphetamines       Drugs - (Collateral Perspective): sporadic according to patient    Additional Comments: Pt has a history of drug use and currently uses although not as often.    Education:     Currently Enrolled? No  Associate/Bachelor Degree    Special Education? No    Interested in Completing Education/GED: No    Employment and Financial:     Currently employed? Unemployed Reason for unemployment: quit job, emotional problems, alcohol use    Source of Income: none    Able to afford basic needs (food, shelter, utilities)? No    Who manages finances/personal affairs? patient      Service:     [place]? yes: [employer], date: 1994    Combat Service? Yes     Community Resources:     Describe present use of community resources: none     Identify previously used community resources   (Include previous mental health treatment - outpatient and inpatient): none    Environmental:     Current living situation:Lives with family    Social Evaluation:     Patient Assets: Average or above intelligence    Patient Limitations:  none    High risk psychosocial issues that may impact discharge planning:   homeless    Recommendations:     Anticipated discharge plan:   Inpatient rehab    High risk issues requiring early treatment planning and immediate intervention: risk for relapse    Community resources needed for discharge planning:  aftercare treatment sources    Anticipated social work role(s) in treatment and discharge planning: Facilitating admission to rehab.

## 2022-05-31 PROBLEM — R45.851 SUICIDAL IDEATION: Status: RESOLVED | Noted: 2022-05-28 | Resolved: 2022-05-31

## 2022-05-31 PROCEDURE — 11400000 HC PSYCH PRIVATE ROOM

## 2022-05-31 PROCEDURE — 11000001 HC ACUTE MED/SURG PRIVATE ROOM

## 2022-05-31 NOTE — PROGRESS NOTES
"[facility]  Psychiatry  Progress Note    Patient Name: Ranjith Jimenez  MRN: [id number]   Code Status: Full Code  Admission Date: 5/28/2022  Hospital Length of Stay: 3 days  Expected Discharge Date:   Attending Physician: Ge Viramontes MD  Primary Care Provider: Primary Doctor No    Current Legal Status: Physician's Emergency Certificate (PEC)    Patient information was obtained from patient.       Subjective:     Patient is a 51 y.o., male, presents with:    Principal Problem:Recurrent major depressive disorder    Chief Complaint: I made bad decisions.    HPI:   51 year old male admitted on a PEC. His mother initiated an OPC out of concern for his recent decisions, being homeless and sleeping in the streets for the first time in his life and constant arguing with her. She also reported that he said if he had to sleep in the streets he would kill himself. He didn't deny the statement but he denied wanting to harm himself. Today he reports that mentally and physically he feels that he is "finally doing good" and that the only problem is that he has "lost everything" and "has no place to live and no job". Stated "I was a very heavy drinker and abused stimulants for a long time. 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 He  from his girlfriend one year before covid began and became severely depressed over not being able to see his children. He claims that it was because she was manipulative but he was using at the time. He can only visit them at his mother's home and they do not get along. He claims that she "meddles and is controlling and doesn't know her place". 8 months ago he moved to [place] and was working for [employer]. Reportedly hurt his back and moved back to his home only to find it "completely trashed and in foreclosure". He admits to struggling with depression " and anxiety in the past. He reports responding poorly to antidepressants and is refusing to start any here. He is hoping to get signed up for his VA benefits in order to assist him at this time. He denied any suicidal or homicidal ideations and he denied any death wishes.      Hospital Course: He was admitted without any medications. He denies needing any and is refusing to start any. He will let us know if any symptoms either appear or worsen. He was encouraged to participate in all groups and unit activities however he has not as of yet.      Interval History: Patient states he made a lot of bad decisions and lost everything. He has been homeless. He was using alcohol and  methamphetamine but has been clean a few months. He has been homeless and staying with friends or his elderly parents. He argued with mother and she had him put in the hospital. He did send her a message stating if she kicked him out he would kill himself. No si/hi. No psychosis. Thoughts clear. No withdrawals.  He will consider going to rehab or residential program.    Psychiatric Mental Status Exam:  General Appearance: appears stated age, well-developed, well-nourished  Arousal: alert  Behavior: cooperative  Movements and Motor Activity: no abnormal involuntary movements noted  Orientation: oriented to person, place, time, and situation  Speech: normal rate, normal rhythm, normal volume, normal tone  Mood: anxious  Affect: mood-congruent  Thought Process: linear, logical  Associations: intact  Thought Content and Perceptions: no suicidal ideation, no homicidal ideation, no auditory hallucinations, no visual hallucinations, no paranoid ideation, no ideas of reference, no evidence of delusions or psychosis  Recent and Remote Memory: recent memory intact, remote memory intact  Attention and Concentration: intact, attentive to conversation  Fund of Knowledge: intact, aware of current events, vocabulary appropriate  Insight: limited  Judgment:  poor  Family History       Problem Relation (Age of Onset)    Anxiety disorder Mother          Tobacco Use    Smoking status: Never Smoker    Smokeless tobacco: Never Used   Substance and Sexual Activity    Alcohol use: Not Currently     Comment: heavy drinking in the past    Drug use: Not Currently     Types: Amphetamines    Sexual activity: Not Currently     Psychotherapeutics (From admission, onward)                None             Review of Systems  Objective:     Vital Signs (Most Recent):  Temp: 97.6 °F (36.4 °C) (05/31/22 0600)  Pulse: 62 (05/31/22 0600)  Resp: 20 (05/31/22 0600)  BP: 127/86 (05/31/22 0600) Vital Signs (24h Range):  Temp:  [97.6 °F (36.4 °C)-98.2 °F (36.8 °C)] 97.6 °F (36.4 °C)  Pulse:  [62-72] 62  Resp:  [18-20] 20  BP: (127-130)/(82-86) 127/86        Weight: 91.5 kg (201 lb 11.5 oz)  There is no height or weight on file to calculate BMI.    No intake or output data in the 24 hours ending 05/31/22 0939    Physical Exam     Significant Labs: Last 24 Hours:   Recent Lab Results       None            Significant Imaging: None       Scheduled Medications:      PRN Medications:  acetaminophen, aluminum & magnesium hydroxide-simethicone, ibuprofen, magnesium hydroxide 400 mg/5 ml    Review of patient's allergies indicates:  No Known Allergies    Assessment/Plan:     * Recurrent major depressive disorder  Medications were discussed but he refused to start any at this time. Claims his issues are situational and that he does not need any medication         Need for Continued Hospitalization:  Protective inpatient psychiatric hospitalization required while a safe disposition plan is enacted.    Anticipated Disposition:  Rehab Facility    Total time:  15 with greater than 50% of this time spent in counseling and/or coordination of care.       Ge Viramontes MD   Psychiatry  [facility]

## 2022-05-31 NOTE — PSYCH
05/31/2022 12:02 PM       TREATMENT  TEAM NOTE      Pt attended treatment team.  Reviewed goals and objectives.  Patient states he made a lot of bad decisions and lost everything. He has been homeless. He was using alcohol and  methamphetamine but has been clean a few months. He has been homeless and staying with friends or his elderly parents. He argued with mother and she had him put in the hospital. He did send her a message stating if she kicked him out he would kill himself. No si/hi. No psychosis. Thoughts clear. No withdrawals.  He will consider going to rehab or residential program.  Pt trying to get into [facility] [place] program

## 2022-05-31 NOTE — SUBJECTIVE & OBJECTIVE
Interval History: Patient states he made a lot of bad decisions and lost everything. He has been homeless. He was using alcohol and  methamphetamine but has been clean a few months. He has been homeless and staying with friends or his elderly parents. He argued with mother and she had him put in the hospital. He did send her a message stating if she kicked him out he would kill himself. No si/hi. No psychosis. Thoughts clear. No withdrawals.  He will consider going to rehab or residential program.    Psychiatric Mental Status Exam:  General Appearance: appears stated age, well-developed, well-nourished  Arousal: alert  Behavior: cooperative  Movements and Motor Activity: no abnormal involuntary movements noted  Orientation: oriented to person, place, time, and situation  Speech: normal rate, normal rhythm, normal volume, normal tone  Mood: anxious  Affect: mood-congruent  Thought Process: linear, logical  Associations: intact  Thought Content and Perceptions: no suicidal ideation, no homicidal ideation, no auditory hallucinations, no visual hallucinations, no paranoid ideation, no ideas of reference, no evidence of delusions or psychosis  Recent and Remote Memory: recent memory intact, remote memory intact  Attention and Concentration: intact, attentive to conversation  Fund of Knowledge: intact, aware of current events, vocabulary appropriate  Insight: limited  Judgment: poor  Family History       Problem Relation (Age of Onset)    Anxiety disorder Mother          Tobacco Use    Smoking status: Never Smoker    Smokeless tobacco: Never Used   Substance and Sexual Activity    Alcohol use: Not Currently     Comment: heavy drinking in the past    Drug use: Not Currently     Types: Amphetamines    Sexual activity: Not Currently     Psychotherapeutics (From admission, onward)                None             Review of Systems  Objective:     Vital Signs (Most Recent):  Temp: 97.6 °F (36.4 °C) (05/31/22 0600)  Pulse: 62  (05/31/22 0600)  Resp: 20 (05/31/22 0600)  BP: 127/86 (05/31/22 0600) Vital Signs (24h Range):  Temp:  [97.6 °F (36.4 °C)-98.2 °F (36.8 °C)] 97.6 °F (36.4 °C)  Pulse:  [62-72] 62  Resp:  [18-20] 20  BP: (127-130)/(82-86) 127/86        Weight: 91.5 kg (201 lb 11.5 oz)  There is no height or weight on file to calculate BMI.    No intake or output data in the 24 hours ending 05/31/22 0939    Physical Exam     Significant Labs: Last 24 Hours:   Recent Lab Results       None            Significant Imaging: None

## 2022-05-31 NOTE — NURSING
Awake alert and oriented.Slept well last night without Trazadone. Appetite is good. Focused on discharge, waiting on placement at [facility]. Denies SI or hallucinations. Pt is not on any medications. Appropriately goal oriented towards discharge. Will continue to monitor mood and behavior and offer emotional support.

## 2022-05-31 NOTE — PSYCH
MULTIDISCILINARY TEAM      ______________________________________________________________________  Psychiatrist Signature           Print Name    Credentials    Date/Time         ______________________________________________________________________  Psychologist Signature          Print Name    Credentials    Date/Time          ______________________________________________________________________  Psychologist Signature           Print Name    Credentials    Date/Time         _______________________________________________________________________  Psychologist Signature           Print Name    Credentials    Date/Time              _______________________________________________________________________  Psychologist Signature          Print Name    Credentials    Date/Time         _______________________________________________________________________  Resident Signature             Print Name    Credentials    Date/Time                      _______________________________________________________________________  Registered Nurse Signature         Print Name    Credentials    Date/Time                  _______________________________________________________________________   Signature           Print Name    Credentials    Date/Time         _______________________________________________________________________   Signature          Print Name    Credentials    Date/Time                  ESTIMATED LOS: __________      I have reviewed my treatment plan with staff and have signed the Patient Responsibilities form.      ______________________________________________________________________  Patient Signature   Print Name   Date/Time

## 2022-05-31 NOTE — NURSING
Ranjith awoke at 0445 this AM. Appropriate affect. Pleasant and cooperative. (+) sleep throughout the night. Admitted anxiety and depression. Denied SI and contracted for safety. Improving focus. Can be preoccupied at times. Encouragement given. (+) coping skills and education discussed. Q15 observations maintained for safety and SP. Will continue to monitor closely.

## 2022-05-31 NOTE — GROUP NOTE
Group Psychotherapy       Group Focus: kristian     Number of patients in attendance:8    Group Start Time: 1530  Group End Time:  1600  Groups Date: 5/31/2022  Group Topic:  Behavioral Health  Group Department: [facility]  Group Facilitators:  Christina uSmmers  _____________________________________________________________________    Patient Name: Ranjith Jimenez  MRN: [id number]  Patient Class: IP- Psych   Admission Date\Time: 5/28/2022  3:32 AM  Hospital Length of Stay: 3  Primary Care Provider: Primary Doctor No     Referred by: Behavioral Medicine Unit Treatment Team     Target symptoms: Depression     Patient's response to treatment: Active Listening     Progress toward goals: Progressing adequately     Interval History: n/a     Diagnosis: depression      Plan: Continue treatment on BMU

## 2022-06-01 PROBLEM — F33.9 RECURRENT MAJOR DEPRESSIVE DISORDER: Status: RESOLVED | Noted: 2022-05-28 | Resolved: 2022-06-01

## 2022-06-01 PROCEDURE — 11000001 HC ACUTE MED/SURG PRIVATE ROOM

## 2022-06-01 PROCEDURE — 11400000 HC PSYCH PRIVATE ROOM

## 2022-06-01 NOTE — GROUP NOTE
Activity Group      Group Focus: Offered group to increase interaction with peers and individual focus.     Number of patients in attendance: 4    Group Start Time: 1515  Group End Time:  1600  Groups Date: 6/1/2022  Group Topic:  Behavioral Health  Group Department: [facility]  Group Facilitators:  Lashell Deutsch  _____________________________________________________________________    Patient Name: Ranjith Jimenez  MRN: [id number]  Patient Class: IP- Psych   Admission Date\Time: 5/28/2022  3:32 AM  Hospital Length of Stay: 4  Primary Care Provider: Primary Doctor No     Referred by: Behavioral Medicine Unit Treatment Team     Target symptoms: Depression     Patient's response to treatment: Good participation     Progress toward goals: Progressing well     Interval History: N/A     Diagnosis: Depression with suicidal ideation     Plan: Continue treatment on BMU

## 2022-06-01 NOTE — PLAN OF CARE
Pt denies SI, HI;  Denies any paranoia or severe depression.  States he has learned a lot here.  Sleep pattern is established and he states he feels rested.  Appetite good and nutrition maintained.  Participating in groups.  Eye contact good, no physical complaints. VS wnl.

## 2022-06-01 NOTE — GROUP NOTE
Education Group       Group Focus: Offered group on healthy life skills as a way to improve overall health. ( Diet, exercise)     Number of patients in attendance: 9    Group Start Time: 1015  Group End Time:  1100  Groups Date: 6/1/2022  Group Topic:  Behavioral Health  Group Department: [facility]  Group Facilitators:  Yamilet Akins LPN  _____________________________________________________________________    Patient Name: Ranjith Jimenez  MRN: [id number]  Patient Class: IP- Psych   Admission Date\Time: 5/28/2022  3:32 AM  Hospital Length of Stay: 4  Primary Care Provider: Primary Doctor No     Referred by: Behavioral Medicine Unit Treatment Team     Target symptoms: Depression     Patient's response to treatment: Verbalized understanding Talkative grandiose     Progress toward goals: Progressing well     Interval History: cooperative     Diagnosis: Depression     Plan: Continue treatment on BMU

## 2022-06-01 NOTE — PSYCH
Pt will be discharged to [facility] on 6/2/22.  Pt was cooperative with programming.  Pt denies Hi/SI on discharge.  All referrals and d/c orders have been faxed to provider.

## 2022-06-01 NOTE — PROGRESS NOTES
"[facility]  Psychiatry  Progress Note    Patient Name: Ranjith Jimenez  MRN: [id number]   Code Status: Full Code  Admission Date: 5/28/2022  Hospital Length of Stay: 4 days  Expected Discharge Date: 6/2/2022  Attending Physician: Ge Viramontes MD  Primary Care Provider: Primary Doctor No    Current Legal Status: Physician's Emergency Certificate (PEC)    Patient information was obtained from patient.       Subjective:     Patient is a 51 y.o., male, presents with:    Principal Problem:Recurrent major depressive disorder    Chief Complaint: "fatanstic"    HPI:   51 year old male admitted on a PEC. His mother initiated an OPC out of concern for his recent decisions, being homeless and sleeping in the streets for the first time in his life and constant arguing with her. She also reported that he said if he had to sleep in the streets he would kill himself. He didn't deny the statement but he denied wanting to harm himself. Today he reports that mentally and physically he feels that he is "finally doing good" and that the only problem is that he has "lost everything" and "has no place to live and no job". Stated "I was a very heavy drinker and abused stimulants for a long time. 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 He  from his girlfriend one year before covid began and became severely depressed over not being able to see his children. He claims that it was because she was manipulative but he was using at the time. He can only visit them at his mother's home and they do not get along. He claims that she "meddles and is controlling and doesn't know her place". 8 months ago he moved to [place] and was working for [employer]. Reportedly hurt his back and moved back to his home only to find it "completely trashed and in foreclosure". He admits to struggling with depression " "and anxiety in the past. He reports responding poorly to antidepressants and is refusing to start any here. He is hoping to get signed up for his [facility] benefits in order to assist him at this time. He denied any suicidal or homicidal ideations and he denied any death wishes.      Hospital Course: He was admitted without any medications. He denies needing any and is refusing to start any. He will let us know if any symptoms either appear or worsen. He was encouraged to participate in all groups and unit activities however he has not as of yet. He was calm and cooperative. He did not require psychiatric meds. He agreed to go to a rehab program. He no longer had si/hi and had no psychosis.      Interval History: Seen via video telemedicine from [place] to [place]. Mood stable. No si/hi. No psychosis. No withdrawals. Not on psych meds. He was accepted to [facility] rehab for tomorrow. Stable for discharge.    Psychiatric Mental Status Exam:  General Appearance: appears stated age, well-developed, well-nourished  Arousal: alert  Behavior: cooperative  Movements and Motor Activity: no abnormal involuntary movements noted  Orientation: oriented to person, place, time, and situation  Speech: normal rate, normal rhythm, normal volume, normal tone  Mood: "fantastic"  Affect: mood-congruent  Thought Process: linear, logical  Associations: intact  Thought Content and Perceptions: no suicidal ideation, no homicidal ideation, no auditory hallucinations, no visual hallucinations, no paranoid ideation, no ideas of reference, no evidence of delusions or psychosis  Recent and Remote Memory: recent memory intact, remote memory intact  Attention and Concentration: intact, attentive to conversation  Fund of Knowledge: intact, aware of current events, vocabulary appropriate  Insight: intact  Judgment: intact     Family History       Problem Relation (Age of Onset)    Anxiety disorder Mother          Tobacco Use    Smoking status: Never " Smoker    Smokeless tobacco: Never Used   Substance and Sexual Activity    Alcohol use: Not Currently     Comment: heavy drinking in the past    Drug use: Not Currently     Types: Amphetamines    Sexual activity: Not Currently     Psychotherapeutics (From admission, onward)                None             Review of Systems  Objective:     Vital Signs (Most Recent):  Temp: 97.5 °F (36.4 °C) (06/01/22 0600)  Pulse: 64 (06/01/22 0600)  Resp: 18 (06/01/22 0600)  BP: (!) 144/86 (06/01/22 0600)   Vital Signs (24h Range):  Temp:  [97.5 °F (36.4 °C)-98.1 °F (36.7 °C)] 97.5 °F (36.4 °C)  Pulse:  [64-65] 64  Resp:  [18-20] 18  BP: (124-144)/(86-87) 144/86        Weight: 91.5 kg (201 lb 11.5 oz)  There is no height or weight on file to calculate BMI.    No intake or output data in the 24 hours ending 06/01/22 1412    Physical Exam     Significant Labs: Last 72 Hours:   Recent Lab Results       None            Significant Imaging: None       Scheduled Medications:      PRN Medications:  acetaminophen, aluminum & magnesium hydroxide-simethicone, ibuprofen, magnesium hydroxide 400 mg/5 ml    Review of patient's allergies indicates:  No Known Allergies    Assessment/Plan:     No notes have been filed under this hospital service.  Service: Psychiatry       Need for Continued Hospitalization:  Patient stabilized and ready for discharge from inpatient psychiatric unit.    Anticipated Disposition:  Rehab Facility    Total time:  15 with greater than 50% of this time spent in counseling and/or coordination of care.       Ge Viramontes MD   Psychiatry  [facility]

## 2022-06-01 NOTE — GROUP NOTE
Group Psychotherapy       Group Focus: Psychodynamic Group Psychotherapy      Number of patients in attendance: 5    Group Start Time: 0900  Group End Time:  0945  Groups Date: 6/1/2022  Group Topic:  Behavioral Health  Group Department: [facility]  Group Facilitators:  Russell Saab LCSW  _____________________________________________________________________    Patient Name: Ranjith Jimenez  MRN: [id number]  Patient Class: IP- Psych   Admission Date\Time: 5/28/2022  3:32 AM  Hospital Length of Stay: 4  Primary Care Provider: Primary Doctor No     Referred by: Behavioral Medicine Unit Treatment Team     Target symptoms: Substance Abuse, Depression and Anxiety     Patient's response to treatment: Active Listening, Self-disclosure and Frequent Questions     Progress toward goals: Progressing adequately     Interval History: Pt able to idenitfy changes he can make to avoid making previous mistakes again.     Diagnosis: MDD     Plan: Continue treatment on BMU

## 2022-06-01 NOTE — NURSING
Ranjith was out of bed at 01:45 to check the time. Affect appropriate. Pleasant and cooperative. Displays continued improvement. Hope is increasing. Phone interview with [facility] performed. Awaiting call back. He interacts well with peers and staff. Visible on unit. Denied SI and contracted for safety. Encouragement given. (+) coping skills reinforced. Q15 observations maintained for safety and SP. Will continue to monitor closely.

## 2022-06-01 NOTE — SUBJECTIVE & OBJECTIVE
"Interval History: Seen via video telemedicine from [place] to [place]. Mood stable. No si/hi. No psychosis. No withdrawals. Not on psych meds. He was accepted to [facility] rehab for tomorrow. Stable for discharge.    Psychiatric Mental Status Exam:  General Appearance: appears stated age, well-developed, well-nourished  Arousal: alert  Behavior: cooperative  Movements and Motor Activity: no abnormal involuntary movements noted  Orientation: oriented to person, place, time, and situation  Speech: normal rate, normal rhythm, normal volume, normal tone  Mood: "fantastic"  Affect: mood-congruent  Thought Process: linear, logical  Associations: intact  Thought Content and Perceptions: no suicidal ideation, no homicidal ideation, no auditory hallucinations, no visual hallucinations, no paranoid ideation, no ideas of reference, no evidence of delusions or psychosis  Recent and Remote Memory: recent memory intact, remote memory intact  Attention and Concentration: intact, attentive to conversation  Fund of Knowledge: intact, aware of current events, vocabulary appropriate  Insight: intact  Judgment: intact     Family History       Problem Relation (Age of Onset)    Anxiety disorder Mother          Tobacco Use    Smoking status: Never Smoker    Smokeless tobacco: Never Used   Substance and Sexual Activity    Alcohol use: Not Currently     Comment: heavy drinking in the past    Drug use: Not Currently     Types: Amphetamines    Sexual activity: Not Currently     Psychotherapeutics (From admission, onward)                None             Review of Systems  Objective:     Vital Signs (Most Recent):  Temp: 97.5 °F (36.4 °C) (06/01/22 0600)  Pulse: 64 (06/01/22 0600)  Resp: 18 (06/01/22 0600)  BP: (!) 144/86 (06/01/22 0600)   Vital Signs (24h Range):  Temp:  [97.5 °F (36.4 °C)-98.1 °F (36.7 °C)] 97.5 °F (36.4 °C)  Pulse:  [64-65] 64  Resp:  [18-20] 18  BP: (124-144)/(86-87) 144/86        Weight: 91.5 kg (201 lb 11.5 oz)  There is " no height or weight on file to calculate BMI.    No intake or output data in the 24 hours ending 06/01/22 1412    Physical Exam     Significant Labs: Last 72 Hours:   Recent Lab Results       None            Significant Imaging: None

## 2022-06-02 VITALS
DIASTOLIC BLOOD PRESSURE: 84 MMHG | TEMPERATURE: 98 F | SYSTOLIC BLOOD PRESSURE: 153 MMHG | RESPIRATION RATE: 16 BRPM | WEIGHT: 201.75 LBS | HEART RATE: 66 BPM

## 2022-06-02 NOTE — DISCHARGE SUMMARY
"[facility]  Psychiatry  Discharge Summary      Patient Name: Ranjith Jimenez  MRN: [id number]  Admission Date: 5/28/2022  Hospital Length of Stay: 5 days  Discharge Date and Time: No discharge date for patient encounter.  Attending Physician: Ge Viramontes MD   Discharging Provider: DENISE Levine  Primary Care Provider: Primary Doctor No    HPI:     51 year old male admitted on a PEC. His mother initiated an OPC out of concern for his recent decisions, being homeless and sleeping in the streets for the first time in his life and constant arguing with her. She also reported that he said if he had to sleep in the streets he would kill himself. He didn't deny the statement but he denied wanting to harm himself. Today he reports that mentally and physically he feels that he is "finally doing good" and that the only problem is that he has "lost everything" and "has no place to live and no job". Stated "I was a very heavy drinker and abused stimulants for a long time. 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 He  from his girlfriend one year before covid began and became severely depressed over not being able to see his children. He claims that it was because she was manipulative but he was using at the time. He can only visit them at his mother's home and they do not get along. He claims that she "meddles and is controlling and doesn't know her place". 8 months ago he moved to [place] and was working for [employer]. Reportedly hurt his back and moved back to his home only to find it "completely trashed and in foreclosure". He admits to struggling with depression and anxiety in the past. He reports responding poorly to antidepressants and is refusing to start any here. He is hoping to get signed up for his VA benefits in order to assist him at this time. He denied " any suicidal or homicidal ideations and he denied any death wishes.      Hospital Course:   He was admitted without any medications. He denies needing any and is refusing to start any. He will let us know if any symptoms either appear or worsen. He was encouraged to participate in all groups and unit activities however he has not as of yet. He was calm and cooperative. He did not require psychiatric meds. He agreed to go to a rehab program. He no longer had si/hi and had no psychosis. He engaged more in treatment. Mood stabilized and he was more hopeful. He did not need psych meds. HE agreed to go to rehab and was accepted to [facility]. HE was stable for discharge.       Goals of Care Treatment Preferences:  Code Status: Full Code      * No surgery found *     Consults:   Consults (From admission, onward)        Status Ordering Provider     Inpatient consult to Hospitalist  Once        Provider:  MD Nael Cárdenas SCOTT D        Physical Exam     Significant Labs:   Last 72 Hours:   Recent Lab Results     None          Significant Imaging: None    Smoking Cessation:   Does the patient smoke? No  Does the patient want a prescription for Smoking Cessation? No  Does the patient want counseling for Smoking Cessation? No         Pending Diagnostic Studies:     None        Final Active Diagnoses:      Problems Resolved During this Admission:    Diagnosis Date Noted Date Resolved POA    PRINCIPAL PROBLEM:  Recurrent major depressive disorder [F33.9] 05/28/2022 06/01/2022 Yes    Suicidal ideation [R45.851] 05/28/2022 05/31/2022 Not Applicable      No new Assessment & Plan notes have been filed under this hospital service since the last note was generated.  Service: Psychiatry      Functional Condition: Independent ambulation    Discharged Condition: stable    Disposition: Home or Self Care    Follow Up:   Follow-up Information     [facility] Follow up in 1  day(s).    Contact information:  [address]lyn V[address]  [facility] [address]  [phone]                     Patient Instructions:   No discharge procedures on file.  Medications:  Reconciled Home Medications:      Medication List      STOP taking these medications    hydrOXYzine pamoate 25 MG Cap  Commonly known as: VISTARIL          Is patient being discharged on multiple antipsychotics? No        Total time:30 minutes with greater than 50% of this time spent in counseling and/or coordination of care.     All elements of the transition record were discussed with the patient at discharge and patient agrees to this plan.    Christopher Cagle, PMHNP  Psychiatry  [facility]

## 2022-06-02 NOTE — NURSING
Discharged to [facility] via their  transportation. Denies SI /HI and a/v hallucinations. Improved interaction with peers and staff. Improved sleep cycle and appetite. Med compliant. No c/o side effects

## 2022-06-02 NOTE — GROUP NOTE
Education Group      Group Focus: Offered group on unit safety /medication safety.     Number of patients in attendance: 8    Group Start Time: 1000  Group End Time:  1045  Groups Date: 6/2/2022  Group Topic:  Behavioral Health  Group Department: [facility]  Group Facilitators:  Yamilet Akins LPN  _____________________________________________________________________    Patient Name: Ranjith Jimenez  MRN: [id number]  Patient Class: IP- Psych   Admission Date\Time: 5/28/2022  3:32 AM  Hospital Length of Stay: 5  Primary Care Provider: Primary Doctor No     Referred by: Behavioral Medicine Unit Treatment Team     Target symptoms: Depression     Patient's response to treatment:Verbalized understanding . Good participation.     Progress toward goals: Progressing well     Interval History: cooperative     Diagnosis: depression      Plan: Continue treatment on BMU

## 2022-06-02 NOTE — NURSING
Patient is lying in bed resting quietly.  Patient has constricted affect and anxious mood.  Patient denies SI/HI and reports he is feeling better.  Patient was accepted to a rehab and is looking forward to attending rehab.  No acute distress noted.  Will continue to monitor closely and provide emotional support.